# Patient Record
Sex: FEMALE | Race: WHITE | Employment: FULL TIME | URBAN - METROPOLITAN AREA
[De-identification: names, ages, dates, MRNs, and addresses within clinical notes are randomized per-mention and may not be internally consistent; named-entity substitution may affect disease eponyms.]

---

## 2024-03-13 ENCOUNTER — OFFICE VISIT (OUTPATIENT)
Dept: ORTHOPEDIC SURGERY | Age: 29
End: 2024-03-13

## 2024-03-13 DIAGNOSIS — R52 PAIN: Primary | ICD-10-CM

## 2024-03-13 DIAGNOSIS — S92.124A CLOSED NONDISPLACED FRACTURE OF BODY OF RIGHT TALUS, INITIAL ENCOUNTER: ICD-10-CM

## 2024-03-13 RX ORDER — NORGESTIMATE AND ETHINYL ESTRADIOL 7DAYSX3 28
1 KIT ORAL DAILY
COMMUNITY
Start: 2021-04-08

## 2024-03-13 RX ORDER — TIZANIDINE 4 MG/1
4 TABLET ORAL EVERY 6 HOURS PRN
COMMUNITY

## 2024-03-13 NOTE — PROGRESS NOTES
EHL/FHL/AT/PT/Thom/Achilles. toes wwp w/ BCR <2s.  No open wounds.  No pain with calf squeeze. TTP @ lateral ankle over the ATFL and CFL ligaments as well as lateral and medial subtalar joint .  Significant edema and ecchymosis present to the lateral and medial ankle extending into the foot.  normal silverskoid exam: With the hindfoot in neutral and forefoot supinated there is good ankle dorsiflexion with the knee flexed and extended.   Neutral hindfoot alignment.  No cavovarus nor planovalgus foot deformity  Talar tilt exam : Unable to assess due to pain.  They hurt too much to assess  Anterior drawer exam w/ ankle plantarflexed at 20 deg: Unable to assess due to pain.  They hurt too much to assess    Neuro:  normal SILT to s/s/sp/dp/t.  Reflexes normal: 1+ patella reflex bilaterally, 1+ achilles reflex bilaterally, negative babinski bilaterally. no signs of hyper reflexia or absent reflex    Vascular:  Left            Right  DP 2+        DP 2+  PT 2+        PT 2+     Imaging:   Today I personally reviewed:     X-Ray RIGHT Ankle 3 vw (AP/Lateral/Oblique) for ankle pain   Findings: No signs of displacement of the mortise.  Cortical irregularity noted to the medial aspect of the talus inferior to the medial malleolus.  Suspect nondisplaced talus fracture   Impression: Nondisplaced talus fracture

## 2024-03-15 ENCOUNTER — HOSPITAL ENCOUNTER (OUTPATIENT)
Dept: CT IMAGING | Age: 29
End: 2024-03-15
Payer: COMMERCIAL

## 2024-03-15 ENCOUNTER — HOSPITAL ENCOUNTER (OUTPATIENT)
Dept: CT IMAGING | Age: 29
Discharge: HOME | End: 2024-03-15
Payer: COMMERCIAL

## 2024-03-15 DIAGNOSIS — R52 PAIN: ICD-10-CM

## 2024-03-15 PROCEDURE — 73700 CT LOWER EXTREMITY W/O DYE: CPT

## 2024-03-18 ENCOUNTER — TELEPHONE (OUTPATIENT)
Dept: ORTHOPEDIC SURGERY | Age: 29
End: 2024-03-18

## 2024-03-18 NOTE — TELEPHONE ENCOUNTER
Contacted patient about CT results.  She does have an avulsion fracture of the medial talus but also significant amount of edema within the talar body indicating an impaction injury.  We will continue nonweightbearing in the splint x 2 more weeks.  She will return at that point and we will transition her into a CAM boot with slow progression into weightbearing in the boot.  Patient understands and is agreeable with the plan.  She will contact the clinic should she have any questions or concerns.

## 2024-04-01 ENCOUNTER — OFFICE VISIT (OUTPATIENT)
Dept: ORTHOPEDIC SURGERY | Age: 29
End: 2024-04-01
Payer: COMMERCIAL

## 2024-04-01 DIAGNOSIS — R52 PAIN: Primary | ICD-10-CM

## 2024-04-01 DIAGNOSIS — S92.124A CLOSED NONDISPLACED FRACTURE OF BODY OF RIGHT TALUS, INITIAL ENCOUNTER: ICD-10-CM

## 2024-04-01 PROCEDURE — L4360 PNEUMAT WALKING BOOT PRE CST: HCPCS | Performed by: PHYSICIAN ASSISTANT

## 2024-04-01 PROCEDURE — 99213 OFFICE O/P EST LOW 20 MIN: CPT | Performed by: PHYSICIAN ASSISTANT

## 2024-04-01 RX ORDER — TIZANIDINE 4 MG/1
4 TABLET ORAL EVERY 6 HOURS PRN
Qty: 28 TABLET | Refills: 0 | Status: SHIPPED | OUTPATIENT
Start: 2024-04-01 | End: 2024-04-08

## 2024-04-01 NOTE — PROGRESS NOTES
The patient was prescribed a walker boot for the patient's right foot. The patient wears a size 9 shoe and I fitted them with a M size boot. The patient was fitted and instructed on the use of prescribed walker boot. I explained how to fit themselves and that the plastic flexible piece should always be on the front of the boot and secured by the Velcro straps on top. The air bladder in the boot was adjusted according to proper fit and comfort. The patient walked a short distance and acknowledged satisfaction with current fit. I also explained that they need a heel lift or a higher heeled shoe for the uninvolved LE to help normalize gait and avoid excessive low back stress/strain due to leg length inequality created from walker boot.Patient read and signed documenting they understand and agree to [facility]'s current DME return policy.

## 2024-04-01 NOTE — PROGRESS NOTES
Name: Carmen Martinez  YOB: 1995  Gender: female  MRN: [id number]    Treatment Plan:   I had a thorough discussion with the patient regarding the treatment plan.    Avulsion fracture of the medial talus but also significant amount of edema within the talar body indicating an impaction injury.     Patient understands and in full agreement with the treatment plan.    Place in CAM boot today- slow progression to WB in boot  Formal PT  Tizanidine refill    Weight-bearing status: WBAT in Boot/hardsole shoe        Return to work/work restrictions: none  No medications given    F/u 4-6 weeks without x-ray     CC: right ankle pain    HPI: Carmen Martinez is a 28 y.o. female presents with right ankle/foot pain after a fall on 3/7/2024.  Patient states that she was running down the stairs when she missed a couple of steps causing her ankle to get twisted underneath her.  She states that she had immediate pain and difficulty ambulating.  She presented to an urgent care where she was diagnosed with an ankle sprain.  She states that later the radiologist called her back and noted a nondisplaced talus fracture.  She was placed in a splint and has been nonweightbearing.  She has been taking ibuprofen and a muscle relaxer for pain.    4/1/24-patient states that she has improved since her last visit.  She has been compliant with nonweightbearing in the splint.  Patient was sent for CT of right ankle/foot that revealed an avulsion fracture but more evidence of an impaction injury of the talus.  She states that her swelling has significantly decreased.  She has not taken any medication in over a week and a half.    Attempted Treatment: Splint and nonweightbearing    ROS:  Patient Denies fever/chills, headache, visual changes, chest pain, shortness of breath, and nausea/vomiting/diarrhea     Tobacco:  has no history on file for tobacco use.  Diabetes: None  No results found for: \"LABA1C\"    No past medical history on

## 2024-04-16 ENCOUNTER — TELEPHONE (OUTPATIENT)
Dept: ORTHOPEDIC SURGERY | Age: 29
End: 2024-04-16

## 2024-05-08 ENCOUNTER — OFFICE VISIT (OUTPATIENT)
Dept: ORTHOPEDIC SURGERY | Age: 29
End: 2024-05-08
Payer: COMMERCIAL

## 2024-05-08 DIAGNOSIS — S92.124D CLOSED NONDISPLACED FRACTURE OF BODY OF RIGHT TALUS WITH ROUTINE HEALING, SUBSEQUENT ENCOUNTER: ICD-10-CM

## 2024-05-08 DIAGNOSIS — R52 PAIN: Primary | ICD-10-CM

## 2024-05-08 PROCEDURE — 99213 OFFICE O/P EST LOW 20 MIN: CPT | Performed by: PHYSICIAN ASSISTANT

## 2024-05-08 PROCEDURE — L1902 AFO ANKLE GAUNTLET PRE OTS: HCPCS | Performed by: PHYSICIAN ASSISTANT

## 2024-05-08 NOTE — PROGRESS NOTES
The patient was prescribed a Wraptor brace for the patient's rightfoot. The patient wears a size 9 shoe and I fitted the patient with a M brace. I explained how to fit the brace properly by pulling the lace tabs across top of foot first then under arch and lastly pulling the strap up firmly and attaching to the lateral Velcro strip. Thus forming a figure 8 across the ankle joint. Once the figure 8 is completed they are to secure the top (short circumferential) straps to help avoid the straps from loosening with normal wear.  The patient was able to demonstrate proper fitting in office to ensure compliance with device and acknowledged satisfaction with current fit. Patient read and signed documenting they understand and agree to [facility]'s current DME return policy.

## 2024-05-08 NOTE — PROGRESS NOTES
Name: Carmen Martinez  YOB: 1995  Gender: female  MRN: [id number]    Treatment Plan:   I had a thorough discussion with the patient regarding the treatment plan.    Avulsion fracture of the medial talus but also significant amount of edema within the talar body indicating an impaction injury.     Patient understands and in full agreement with the treatment plan.    Given ASO brace today.  Will begin transition from CAM boot to brace  Continue PT    Weight-bearing status: WBAT in Boot/hardsole shoe        Return to work/work restrictions: none  No medications given    F/u 6 weeks without x-ray-if doing well then full release at that time     CC: right ankle pain    HPI: Carmen Martinez is a 28 y.o. female presents with right ankle/foot pain after a fall on 3/7/2024.  Patient states that she was running down the stairs when she missed a couple of steps causing her ankle to get twisted underneath her.  She states that she had immediate pain and difficulty ambulating.  She presented to an urgent care where she was diagnosed with an ankle sprain.  She states that later the radiologist called her back and noted a nondisplaced talus fracture.  She was placed in a splint and has been nonweightbearing.  She has been taking ibuprofen and a muscle relaxer for pain.    4/1/24-patient states that she has improved since her last visit.  She has been compliant with nonweightbearing in the splint.  Patient was sent for CT of right ankle/foot that revealed an avulsion fracture but more evidence of an impaction injury of the talus.  She states that her swelling has significantly decreased.  She has not taken any medication in over a week and a half.    5/8/2024-patient states that she is doing well.  She has progressed well with physical therapy.  The only pain that she will have occasionally when ambulating around the house or PT without the boot is in the lateral ankle, over the ATFL and CFL ligaments.  She states

## 2024-06-19 ENCOUNTER — OFFICE VISIT (OUTPATIENT)
Dept: ORTHOPEDIC SURGERY | Age: 29
End: 2024-06-19
Payer: COMMERCIAL

## 2024-06-19 DIAGNOSIS — S92.124D CLOSED NONDISPLACED FRACTURE OF BODY OF RIGHT TALUS WITH ROUTINE HEALING, SUBSEQUENT ENCOUNTER: Primary | ICD-10-CM

## 2024-06-19 PROCEDURE — 99213 OFFICE O/P EST LOW 20 MIN: CPT | Performed by: PHYSICIAN ASSISTANT

## 2024-06-19 NOTE — PROGRESS NOTES
soft tissue swelling about the avulsion fractures of the talus. Tendinous or ligamentous injury posterior talofibular ligament cannot be excluded.     1.  Avulsion fractures off the medial talus consistent with an avulsion of the tibiotalar ligament inferiorly. 2.  Soft tissue swelling along the lateral ankle with no definite fracture of the fibula or lateral talus. A ligamentous injury in the posterior talofibular ligament cannot be excluded. 3.  Ossific fragment in the region of the anterior process of the calcaneus felt to be due to either an unusual ossicle or an old healed injury with no definite acute fracture seen in this region.     CT FOOT RIGHT WO CONTRAST    Result Date: 3/15/2024  EXAMINATION: CT ANKLE RIGHT WO CONTRAST, CT FOOT RIGHT WO CONTRAST 3/15/2024 8:00 AM ACCESSION NUMBER: [id number], [id number] COMPARISON: None available INDICATION: Pain, unspecified TECHNIQUE: Multiple-row detector helical CT examination of the right ankle without intravenous contrast.  Multiplanar reformatting was provided. Subsequently, a CT of the right foot was obtained. Again, multiplanar reformats were made available. Radiation dose reduction techniques were used for this study. Our CT scanners use one or all of the following: Automated exposure control, adjustment of the mA and/or kV according to patient size, iterative reconstruction.  FINDINGS: BONES: 2 thin linear ossific fragments are seen off the medial talus just beneath the medial malleolus. This is consistent with a avulsion fracture of the talus which is likely from the tibiotalar ligament. Significant soft tissue swelling is seen about this fracture site. No dislocation is evident. No other fractures are demonstrated. Note is made of a small less than 1 cm bony fragment off the anterior process of the calcaneus at the inferior talonavicular articulation. There is no donor site and this appears well-corticated suggesting either an unusual ossicle or an old